# Patient Record
Sex: FEMALE | Race: WHITE | NOT HISPANIC OR LATINO | URBAN - METROPOLITAN AREA
[De-identification: names, ages, dates, MRNs, and addresses within clinical notes are randomized per-mention and may not be internally consistent; named-entity substitution may affect disease eponyms.]

---

## 2017-03-23 ENCOUNTER — COMMUNICATION - HEALTHEAST (OUTPATIENT)
Dept: TELEHEALTH | Facility: CLINIC | Age: 37
End: 2017-03-23

## 2017-03-23 ENCOUNTER — OFFICE VISIT - HEALTHEAST (OUTPATIENT)
Dept: OBGYN | Facility: CLINIC | Age: 37
End: 2017-03-23

## 2017-03-23 DIAGNOSIS — Z00.00 ROUTINE GENERAL MEDICAL EXAMINATION AT A HEALTH CARE FACILITY: ICD-10-CM

## 2017-03-23 DIAGNOSIS — Z12.31 VISIT FOR SCREENING MAMMOGRAM: ICD-10-CM

## 2017-03-23 ASSESSMENT — MIFFLIN-ST. JEOR: SCORE: 1532.92

## 2017-03-27 ENCOUNTER — HOSPITAL ENCOUNTER (OUTPATIENT)
Dept: MAMMOGRAPHY | Facility: HOSPITAL | Age: 37
Discharge: HOME | End: 2017-03-27
Attending: FAMILY MEDICINE

## 2017-03-27 DIAGNOSIS — Z12.31 VISIT FOR SCREENING MAMMOGRAM: ICD-10-CM

## 2017-03-28 LAB
HPV INTERPRETATION - HISTORICAL: NORMAL
HPV INTERPRETER - HISTORICAL: NORMAL

## 2017-03-31 LAB

## 2017-05-03 ENCOUNTER — OFFICE VISIT - HEALTHEAST (OUTPATIENT)
Dept: INTERNAL MEDICINE | Facility: CLINIC | Age: 37
End: 2017-05-03

## 2017-05-03 DIAGNOSIS — K59.00 CONSTIPATION: ICD-10-CM

## 2017-05-03 DIAGNOSIS — R68.89 COLD INTOLERANCE: ICD-10-CM

## 2017-05-03 DIAGNOSIS — M54.9 BACK PAIN: ICD-10-CM

## 2017-05-03 DIAGNOSIS — Z87.898 HISTORY OF SEIZURE: ICD-10-CM

## 2017-05-03 DIAGNOSIS — Z90.710 HISTORY OF HYSTERECTOMY: ICD-10-CM

## 2017-05-03 DIAGNOSIS — F32.A DEPRESSION: ICD-10-CM

## 2017-05-03 ASSESSMENT — MIFFLIN-ST. JEOR: SCORE: 1527.48

## 2017-05-04 ENCOUNTER — COMMUNICATION - HEALTHEAST (OUTPATIENT)
Dept: INTERNAL MEDICINE | Facility: CLINIC | Age: 37
End: 2017-05-04

## 2017-05-31 ENCOUNTER — OFFICE VISIT - HEALTHEAST (OUTPATIENT)
Dept: INTERNAL MEDICINE | Facility: CLINIC | Age: 37
End: 2017-05-31

## 2017-05-31 DIAGNOSIS — K59.00 CONSTIPATION: ICD-10-CM

## 2017-05-31 DIAGNOSIS — Z87.898 HISTORY OF SEIZURE: ICD-10-CM

## 2017-05-31 DIAGNOSIS — M54.9 BACK PAIN: ICD-10-CM

## 2017-05-31 DIAGNOSIS — F41.8 DEPRESSION WITH ANXIETY: ICD-10-CM

## 2017-07-12 ENCOUNTER — OFFICE VISIT - HEALTHEAST (OUTPATIENT)
Dept: INTERNAL MEDICINE | Facility: CLINIC | Age: 37
End: 2017-07-12

## 2017-07-12 DIAGNOSIS — H61.101 LESION OF PINNA, RIGHT: ICD-10-CM

## 2017-07-12 DIAGNOSIS — F41.8 DEPRESSION WITH ANXIETY: ICD-10-CM

## 2017-08-02 ENCOUNTER — COMMUNICATION - HEALTHEAST (OUTPATIENT)
Dept: ADMINISTRATIVE | Facility: CLINIC | Age: 37
End: 2017-08-02

## 2017-10-25 ENCOUNTER — OFFICE VISIT - HEALTHEAST (OUTPATIENT)
Dept: OTOLARYNGOLOGY | Facility: CLINIC | Age: 37
End: 2017-10-25

## 2017-10-25 DIAGNOSIS — H61.91 LESION OF RIGHT EXTERNAL EAR: ICD-10-CM

## 2017-10-25 RX ORDER — FLUOCINOLONE ACETONIDE 0.25 MG/G
OINTMENT TOPICAL
Qty: 15 G | Refills: 0 | Status: SHIPPED | OUTPATIENT
Start: 2017-10-25

## 2017-12-22 ENCOUNTER — COMMUNICATION - HEALTHEAST (OUTPATIENT)
Dept: INTERNAL MEDICINE | Facility: CLINIC | Age: 37
End: 2017-12-22

## 2017-12-22 RX ORDER — VENLAFAXINE HYDROCHLORIDE 37.5 MG/1
37.5 CAPSULE, EXTENDED RELEASE ORAL DAILY
Qty: 90 CAPSULE | Refills: 1 | Status: SHIPPED | OUTPATIENT
Start: 2017-12-22

## 2021-05-30 VITALS — BODY MASS INDEX: 35.61 KG/M2 | WEIGHT: 201 LBS | HEIGHT: 63 IN

## 2021-05-31 VITALS — BODY MASS INDEX: 35.82 KG/M2 | WEIGHT: 199 LBS

## 2021-05-31 VITALS — WEIGHT: 199.8 LBS | HEIGHT: 63 IN | BODY MASS INDEX: 35.4 KG/M2

## 2021-05-31 VITALS — BODY MASS INDEX: 35.96 KG/M2 | WEIGHT: 199.8 LBS

## 2021-06-09 NOTE — PROGRESS NOTES
Assessment:     1. Routine general medical examination at a health care facility     2. Visit for screening mammogram          Plan:      I recommended she eat a healthy diet and exercise on a regular basis. Pap smear and HPV testing today. Mammogram ordered. Discussed importance of regular PCP and gave options for who to establish care with. PHI signed to review previous records including gyn history that lead up to partial hyst for cervical dysplasia. The following high BMI interventions were performed this visit: encouragement to exercise   Encouraged smoking cessation.     Addendum: Reviewed records from [place] showing KRISTOFER II-III on LEEP with focally pos margins in . Awaiting on records from [facility] that followed LEEP.   Now pap and hpv negative. Pt notified and encourage to repeat in 3 years.         Subjective:     Babs Huddleston is a 36 y.o. female who presents for an annual exam.  Moved from [place] 4 years ago and hasn't had routine care since this time. Here for CPE. Had partial hyst in  for cervical dysplasia. Ovaries in place, but uterus/cervix removed per pt. Will get records. 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 Smokes 6-7 cig/day and is trying to quit. She doesn't want rx help at this time. Has some anxiety when she leaves the house so she doesn't exercise very much.     The patient reports that there is not domestic violence in her life.     Healthy Habits:   Regular Exercise: no    Sunscreen Use: Yes  Healthy Diet: Yes  Dental Visits Regularly: No  Sexually active: Yes  Colonoscopy: N/A  Prevention of Osteoporosis: No  Last Dexa: N/A      There is no immunization history on file for this patient.  Immunization status: up to date and documented.    Gynecologic History  No LMP recorded.  Contraception: none  Last Pap: unknown. Results were: h/o cervical dysplasia and partial hyst in . PHI signed.  Last mammogram: 4+ years ago. Results were: Unknown, but PHI signed    OB History     Para Term  AB SAB TAB Ectopic Multiple Living   7 4   3 3    4      # Outcome Date GA Lbr Regis/2nd Weight Sex Delivery Anes PTL Lv   7 SAB            6 SAB            5 SAB            4 Para            3 Para            2 Para            1 Para                   No current outpatient prescriptions on file.     No current facility-administered medications for this visit.      Past Medical History:   Diagnosis Date     Anxiety      Cervical dysplasia      Depression      Seizures      Past Surgical History:   Procedure Laterality Date     HERNIA REPAIR       PARTIAL HYSTERECTOMY       Review of patient's allergies indicates no known allergies.  Family History   Problem Relation Age of Onset     Depression Sister      Breast cancer Maternal Grandmother      Cancer Maternal Grandfather      lung cancer     Heart disease Paternal Grandfather      Social History     Social History     Marital status:      Spouse name: N/A     Number of children: N/A     Years of education: N/A     Occupational History     Not on file.     Social History Main Topics     Smoking status: Light Tobacco Smoker     Smokeless tobacco: Not on file     Alcohol use No     Drug use: Yes     Special: Marijuana     Sexual activity: Yes     Partners: Male     Birth control/ protection: None, Other      Comment: partial hysterectomy     Other Topics Concern     Not on file     Social History Narrative     No narrative on file       Review of Systems  General:  Negative except as noted above  Eyes: Negative except as noted above  Ears/Nose/Throat: Negative except as noted above  Cardiovascular: Negative except as noted above  Respiratory:  Negative except as noted above  Gastrointestinal:  Negative except as noted above  Musculoskeletal:  Negative except as noted above  Skin: Negative except as noted above  Neurologic: Negative except as noted above  Psychiatric: Negative except as noted above  Endocrine: Negative except as noted  "above  Heme/Lymphatic: Negative except as noted above   Allergic/Immunologic: Negative except as noted above      Objective:      /67 (Patient Site: Right Arm, Patient Position: Sitting, Cuff Size: Adult Regular)  Pulse 72  Ht 5' 2.5\" (1.588 m)  Wt 201 lb (91.2 kg)  SpO2 98%  BMI 36.18 kg/m2    Physical Exam:  General Appearance: Alert, cooperative, no distress.  Head: Normocephalic, without obvious abnormality, atraumatic  Eyes: PERRL, conjunctiva/corneas clear, EOM's intact  Ears: Normal TM's and external ear canals, both ears  Nose: Nares normal, septum midline,mucosa normal, no drainage  Throat: Lips, mucosa, and tongue normal  Neck: Supple, symmetrical, trachea midline, no adenopathy;  thyroid: not enlarged, symmetric, no tenderness/mass/nodules  Back: Symmetric, no curvature, ROM normal, no CVA tenderness  Lungs: Clear to auscultation bilaterally, respirations unlabored  Breasts: No breast masses, tenderness, asymmetry, or nipple discharge.  Heart: Regular rate and rhythm, S1 and S2 normal, no murmur, rub, or gallop, Abdomen: Soft, non-tender, bowel sounds active all four quadrants,  no masses, no organomegaly  Pelvic: Normal-appearing female genitalia.  Normal vaginal mucosa on speculum exam. Cervix absent.  No adnexal masses. Absent uterus.   Extremities: Extremities normal, atraumatic, no cyanosis or edema  Skin: Skin color, texture, turgor normal, no rashes or lesions  Lymph nodes: Cervical, supraclavicular, and axillary nodes normal  Neurologic: Normal  Psych: Normal affect.  Does not appear anxious or depressed.     "

## 2021-06-10 NOTE — PROGRESS NOTES
Internal Medicine Office Visit  Patient Name: Babs Becker  Patient Age: 36 y.o.  YOB: 1980  MRN: [id number]  ?  Date of Visit: 5/3/2017  Reason for Office Visit:   Chief Complaint   Patient presents with     [facility]     [facility] from [facility], records in media. Depression/ anxiety       Assessment / Plan / Medical Decision Makin. History of seizure  2. Cold intolerance  3. Depression  4. History of hysterectomy-- d/t cervical dysplagia  - PAP smear UTD  - Initially discussed using Wellbutrin for treatment of depression, however this was before she mentioned a history of seizure disorder.  This prescription has been canceled and she is advised to not take this medication as this may lower her seizure threshold.  Instead for depression, will start venlafaxine 37.5 mg daily.  Follow-up in the office in 3 weeks for depression recheck.  She declines referral to a psychologist at the present time.  Could consider echo therapy at the spine center due to concomitant chronic back pain  - Referral to neurology for follow-up of seizure disorder.  She has not had a seizure in the past 10 years.  - tetanus booster today  - Tobacco cessation recommended, she is interested in cessation      Health Maintenance Review  Health Maintenance   Topic Date Due     DEPRESSION FOLLOW UP  1978     TD 18+ HE  1996     TDAP ADULT ONE TIME DOSE  1996     ADVANCE DIRECTIVES DISCUSSED WITH PATIENT  1996     INFLUENZA VACCINE RULE BASED (Season Ended) 2017     PAP SMEAR  2022         I am having Ms. Becker start on venlafaxine.     HPI:   Encounter Diagnoses   Name Primary?     History of seizure Yes     Cold intolerance      Depression      History of hysterectomy-- d/t cervical dysplagia      Constipation      Back pain       Babs Becker is a 36-year-old female who presents to the office today [facility].  She states that she moved here from [place] in  but has not  sought any kind of medical treatment since moving here.  At first she did not have health insurance.    We reviewed her history of depression.  She states that she does not have any thoughts of suicide but thinks about not waking up sometimes.  Feels fatigued and unmotivated particularly in the morning.  She sometimes has to force herself to get up and take a shower.  She has been treated with sertraline and duloxetine in the past.  She states that she had temporary relief in symptoms but that within 6 months to 1 year these medications were no longer effective.    Patient has generalized body aches.  States that her back pain is the most bothersome to her.  She states she had an injury at age 17 when she fell out of the back of a truck.  She was told she may have had a pelvic fracture at some point in the past.  She has had treatment with muscle relaxers and naproxen.    Continues to smoke regularly.  States that she does this typically when she is bored.  She does wish to quit.    She has a history of seizure disorder.  She had a febrile seizure at age 1.  Another seizure around age 7 or 8.  Most recent she is seizure was at age 22.  She was previously taking Keppra but then lost her health insurance and no longer followed up with neurology or took this medication.    She describes frequent leg movements and tremors particularly at night.  She is uncertain if she continues to have seizure-like activity.    She has recurrent constipation.  She takes an over-the-counter laxative when needed.  She typically uses a limited cayenne pepper combination is a natural supplement to help with bowel movements.    She has a history of cervical dysplasia, tx with hysterectomy.  Recent Pap smear was normal.    Review of Systems: If positive, the following ROS is bolded:  Constitutional: Fever, chills, night sweats fainting, weight change (gain), sleeping difficulties (frequent leg movements)  Eyes: Visual changes, eye pain,  "double vision  HENT: Changes in hearing, ear pain, tinnitus, hoarseness, difficulty swallowing  Respiratory: Wheeze, shortness of breath, cough, and exercise intolerance   Cardiovascular: Chest pain, dyspnea, tachycardia, palpitations, syncope, dizziness or lightheadedness  Gastrointestinal: Nausea, vomiting, diarrhea, dyspepsia, irregular BMs, and melena, constipation  Genitourinary: Dysuria, frequency, hematuria, nocturia, vaginal discharge, pelvic pain  Integumentary: Pruritis, rashes, lesions, wounds   Musculoskeletal: Back pain, joint pain/stiffness  Neurological: Changes in balance, mental status, paresthesias, weakness, headache  Behavioral/Psych: Difficulty concentrating, mood swings, depression or anxiety, insomnia  Endo: cold intolerance     Current Scheduled Meds:  Outpatient Encounter Prescriptions as of 5/3/2017   Medication Sig Dispense Refill     venlafaxine (EFFEXOR XR) 37.5 MG 24 hr capsule Take 1 capsule (37.5 mg total) by mouth daily. 30 capsule 0     [DISCONTINUED] buPROPion (WELLBUTRIN SR) 150 MG 12 hr tablet Take 1 tablet daily x 3 days then increase to twice daily thereafter. Stop smoking 7 days after starting the medication. 60 tablet 1     No facility-administered encounter medications on file as of 5/3/2017.      Past Medical History:   Diagnosis Date     Anxiety      Cervical dysplasia      Depression      Seizures      Past Surgical History:   Procedure Laterality Date     CERVICAL BIOPSY  W/ LOOP ELECTRODE EXCISION  2005    see scanned records from [place]     HERNIA REPAIR       PARTIAL HYSTERECTOMY       Social History   Substance Use Topics     Smoking status: Light Tobacco Smoker     Smokeless tobacco: None     Alcohol use No       Objective / Physical Examination:  Vitals:    05/03/17 1606   BP: 124/80   Patient Site: Left Arm   Patient Position: Sitting   Cuff Size: Adult Large   Pulse: 79   Weight: 199 lb 12.8 oz (90.6 kg)   Height: 5' 2.5\" (1.588 m)     Wt Readings from Last 3 " Encounters:   05/03/17 199 lb 12.8 oz (90.6 kg)   03/23/17 201 lb (91.2 kg)     Body mass index is 35.96 kg/(m^2).     General Appearance: Alert and oriented, cooperative, affect appropriate, speech clear, in no apparent distress  Lungs: Clear to auscultation bilaterally. Normal inspiratory and expiratory effort  Cardiovascular: Regular rate, normal S1, S2. No murmurs, rubs, or gallops  Psych: Casually groomed. Thought content normal. Reserved     Orders Placed This Encounter   Procedures     Tdap vaccine,  8yo or older,  IM     Thyroid Stimulating Hormone (TSH)     Ambulatory referral to Neurology   Followup: Return in about 3 weeks (around 5/24/2017) for Recheck. earlier if needed.    Time with patient: 30 minutes with >50% of time spent in counseling, coordination of care, and review of PMH    Kaci Hancock, CARLY  [facility]

## 2021-06-11 NOTE — PROGRESS NOTES
Internal Medicine Office Visit  Patient Name: Babs Becker  Patient Age: 36 y.o.  YOB: 1980  MRN: [id number]  ?  Date of Visit: 2017  Reason for Office Visit:   Chief Complaint   Patient presents with     Follow-up     depression and anxiety. Pt stated said with medication anxiety much better but the depression is unchanged       Assessment / Plan / Medical Decision Makin. Depression with anxiety  2. History of seizure  3. Back pain  4. Constipation  - Increase venlafaxine to 75 mg daily  - Consider PT for back pain, patient would like to try her own rehab program at home [facility]  - Keep appointment with neurology       Health Maintenance Review  Health Maintenance   Topic Date Due     ADVANCE DIRECTIVES DISCUSSED WITH PATIENT  1996     INFLUENZA VACCINE RULE BASED (Season Ended) 2017     DEPRESSION FOLLOW UP  2017     PAP SMEAR  2022     TD 18+ HE  2027     TDAP ADULT ONE TIME DOSE  Completed         I have discontinued Ms. Becker's venlafaxine. I am also having her start on venlafaxine.     HPI:   Encounter Diagnoses   Name Primary?     Depression with anxiety Yes     History of seizure      Back pain      Constipation         Smoking- still smokes about 5-7 cigarettes per day. Not interested in smoking cessation assistance at this time. Feels much less anxious, is hopeful she can get a job and that this will help her to stop smoking by a change in schedule.     Anxiety- Very pleased with the improvement in her ascites symptoms.  She can now go to the grocery store without worry and states that she no longer leaves the grocery store early without getting the supplies that she needs.  She is now hopeful that she can get a job and start working as previously her anxiety held her back from even being able to pursue a job.  She still has quite a few depression symptoms.  She states that she is sleeping all of the time and has little motivation to get up and  "do anything.  She sometimes does not feel like doing personal hygiene. Spends a lot of time in her apartment on a couch.      History of seizure disorder- June 22 has an appointment with neurology. Still has twitching in her feet and episodes where she \"spaces out\". Previously treated for absence seizures with Keppra.     Back pain/ body aches- Doesn't seem as intense now that she feels less tense/anxious. Low back pain still there, radiates into her gluts and legs. Occurs on both legs, more often when she is very tense. She previously took naproxen and muscle relaxers. She has done PT before but believes it was around 2010. Will see if increased physical activity helps now that she is less anxious about leaving her apartment.  drives her to appointments because she doesn't like to drive so not interested in PT at this time.    Constipation- Started after a right inguinal hernia surgery. She has a natural supplement of apple cider vinegar/silver pepper/lemon to help her to become more regular. Sometimes she takes docusate. Not very physically active. Admits that she drinks a lot of coffee and tea but only 1-2 glasses of water typically.      Review of Systems:  No thoughts of suicide or self harm. Pertinent findings as in HPI      Current Scheduled Meds:  Outpatient Encounter Prescriptions as of 5/31/2017   Medication Sig Dispense Refill     [DISCONTINUED] venlafaxine (EFFEXOR XR) 37.5 MG 24 hr capsule Take 1 capsule (37.5 mg total) by mouth daily. 30 capsule 0     venlafaxine (EFFEXOR XR) 75 MG 24 hr capsule Take 1 capsule (75 mg total) by mouth daily. 30 capsule 1     No facility-administered encounter medications on file as of 5/31/2017.      Past Medical History:   Diagnosis Date     Anxiety      Cervical dysplasia      Depression      Seizures      Past Surgical History:   Procedure Laterality Date     CERVICAL BIOPSY  W/ LOOP ELECTRODE EXCISION  2005    see scanned records from [place]     HERNIA REPAIR   "     PARTIAL HYSTERECTOMY       Social History   Substance Use Topics     Smoking status: Light Tobacco Smoker     Smokeless tobacco: None     Alcohol use No       Objective / Physical Examination:  Vitals:    05/31/17 1632   BP: 102/70   Patient Site: Right Arm   Patient Position: Sitting   Cuff Size: Adult Regular   Pulse: 70   Weight: 199 lb 12.8 oz (90.6 kg)     Wt Readings from Last 3 Encounters:   05/31/17 199 lb 12.8 oz (90.6 kg)   05/03/17 199 lb 12.8 oz (90.6 kg)   03/23/17 201 lb (91.2 kg)     Body mass index is 35.96 kg/(m^2).     General Appearance: Alert and oriented, cooperative, affect appropriate, speech clear, in no apparent distress  Psych: Casually groomed. Thought content normal. More interactive today than previous visit       Orders Placed This Encounter   Procedures     Tdap vaccine,  6yo or older,  IM   Followup: Return in about 6 weeks (around 7/12/2017) for Recheck. earlier if needed.    Total time spent with patient was 25 minutes with >50% of time spent in face-to-face counseling regarding the above plan       Kaci Hancock, CNP  [facility]

## 2021-06-11 NOTE — PROGRESS NOTES
"Internal Medicine Office Visit  Patient Name: Babs Becker  Patient Age: 36 y.o.  YOB: 1980  MRN: [id number]  ?  Date of Visit: 2017  Reason for Office Visit:   Chief Complaint   Patient presents with     Follow-up       Assessment / Plan / Medical Decision Makin. Depression with anxiety  2. Lesion of pinna, right  - Referral to ENT for pinna lesions, present for the past 3 years per patient  - Increase venlafaxine to 112.5 mg daily. Discussed relaxation techniques. Increase physical activity to improve endurance and conditioning which may contribute to reported fatigue  - Referral to psychology for depression and anxiety-- she needs to check on the cost of this service through her insurance  - Follow up in 2-3 months      Health Maintenance Review  Health Maintenance   Topic Date Due     ADVANCE DIRECTIVES DISCUSSED WITH PATIENT  1996     INFLUENZA VACCINE RULE BASED (1) 2017     DEPRESSION FOLLOW UP  2017     PAP SMEAR  2022     TD 18+ HE  2027     TDAP ADULT ONE TIME DOSE  Completed           I am having Ms. Becker start on venlafaxine. I am also having her maintain her venlafaxine.     HPI:   Encounter Diagnoses   Name Primary?     Depression with anxiety Yes     Lesion of pinna, right       Babs Becker is a 36-year-old female who presents to the office today with her  for follow-up of depression and anxiety.  She has noticed that the venlafaxine has helped with her anxiety symptoms and she is \"100% better\".  She can go out into public and to the store without feeling anxious and wanting to leave.  She still feels tired and has a lack of motivation.  She still has some depressive thoughts and feelings although she is noticing a slight improvement and her  agrees with this.  She is not experiencing any side effects to the medication.    She has a concern regarding a right pinna \"sore\" which has been present for the past 3 years " "approximately.  She repeatedly washes this area and describes off a crusted scabbed area which then always returns.    She has a four-day history of her fourth fingertip tingling on each finger, and achy feeling in her shoulders, and right wrist pain.  She denies any new exercises or activities.  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  She has wrist discomfort and has to \"pop in the wrist to make it feel better\".    Continues to have low back pain with right sciatica.  She is not currently doing any exercises for this.  She previously declined physical therapy.      Review of Systems: No seizures, last seizure age 22 per patient report. Denies thoughts of suicide or self harm.     Current Scheduled Meds:  Outpatient Encounter Prescriptions as of 7/12/2017   Medication Sig Dispense Refill     venlafaxine (EFFEXOR XR) 75 MG 24 hr capsule Take 1 capsule (75 mg total) by mouth daily. 90 capsule 1     [DISCONTINUED] venlafaxine (EFFEXOR XR) 75 MG 24 hr capsule Take 1 capsule (75 mg total) by mouth daily. 30 capsule 1     venlafaxine (EFFEXOR XR) 37.5 MG 24 hr capsule Take 1 capsule (37.5 mg total) by mouth daily. 90 capsule 1     No facility-administered encounter medications on file as of 7/12/2017.      Past Medical History:   Diagnosis Date     Anxiety      Cervical dysplasia      Depression      Seizures      Past Surgical History:   Procedure Laterality Date     CERVICAL BIOPSY  W/ LOOP ELECTRODE EXCISION  2005    see scanned records from [place]     HERNIA REPAIR       PARTIAL HYSTERECTOMY       Social History   Substance Use Topics     Smoking status: Light Tobacco Smoker     Smokeless tobacco: None     Alcohol use No       Objective / Physical Examination:  Vitals:    07/12/17 1603   BP: 112/70   Pulse: 72   Weight: 199 lb (90.3 kg)     Wt Readings from Last 3 Encounters:   07/12/17 199 lb (90.3 kg) "   05/31/17 199 lb 12.8 oz (90.6 kg)   05/03/17 199 lb 12.8 oz (90.6 kg)     Body mass index is 35.82 kg/(m^2).     General Appearance: Alert and oriented, cooperative, affect appropriate, speech clear, in no apparent distress  ENT: right pinna 2 mm ulceration. No bleeding or crusting.   MSK: Full shoulder ROM, strength is 5/5 and symmetrical. No reproduction of pain with movements. Right elbow lateral epicondyle pain to palpation. Negative Phalen.   CV: radial pulses 2+ and symmetrical     Orders Placed This Encounter   Procedures     Ambulatory referral to Psychology     Ambulatory referral to ENT   Followup: Return in about 2 months (around 9/12/2017) for Recheck. earlier if needed.      Kaci Hancock, CNP  [facility]

## 2021-06-13 NOTE — PROGRESS NOTES
Babs Becker is a 37 y.o. female seen in consultation at the request of Dr. Hancock for right ear lesion. She has area of repeated scabbing in the root of the right superir helix.  Will scab and then b painful.  Has repsonded to Neomycin and cocnut oil in the past but then returns.  She notes her skin is generally dry and itchy.     ALLERGY:  No Known Allergies    MEDICATIONS:     Current Outpatient Prescriptions on File Prior to Visit   Medication Sig Dispense Refill     venlafaxine (EFFEXOR XR) 37.5 MG 24 hr capsule Take 1 capsule (37.5 mg total) by mouth daily. 90 capsule 1     venlafaxine (EFFEXOR XR) 75 MG 24 hr capsule Take 1 capsule (75 mg total) by mouth daily. 90 capsule 1     No current facility-administered medications on file prior to visit.        Past Medical/Surgical History, Family History and Social History reviewed in detail and documented separately in the medical record.    Complete Review of Systems:  A 10-point review was performed.  Pertinent positives are noted in the HPI and on a separate scanned document in the chart.    EXAM:  There were no vitals filed for this visit.    Nurse documentation reviewed  and documented separately.    General Appearance: Pleasant, alert, appropriate appearance for age. No acute distress    Head Exam: Normal. Normocephalic, atraumatic.    Eye Exam: Normal external eye, conjunctiva, lids, cornea. Extra-ocular movements are intact.    Left external ear: normal  Left otoscopic exam: Normal EAC. Normal TM     Right external ear:  1 mm area of eczema versus ulcer root of right superior helix.    Right otoscopic exam: Normal EAC. Normal TM    Nose Exam: Normal external nose. Septum midline. Nasal mucosa normal.  Inferior turbinates normal.    OroPharynx Exam: Dental hygiene adequate. Normal tongue. Normal buccal mucosa. Normal palate.  Normal pharynx. Normal tonsils.    Neck Exam: Supple, no masses or nodes. Trachea and larynx midline.    Thyroid Exam: No  tenderness, nodules or enlargement.    Salivary Glands: nontender without masses    Neuro: Alert and oriented times 3, CN 2-12 grossly intact, no nystagmus, PERRL, EOMI, normal speech and gait    Chest/Respiratory Exam: Normal chest wall motion and respiratory effort. No audible stridor or wheezing.    Cardiovascular Exam: Regular rate and rhythm.  No cyanosis, clubbing or edema.    Pulses: carotid pulses normal    ASSESSMENT:  1. Lesion of right external ear        PLAN: Findings, assessment, and management options were discussed. Will try a steroid ointment on it for a couple of weeks and see her for recheck.  If still bothering her, consider punch biopsy

## 2021-06-15 PROBLEM — F41.8 DEPRESSION WITH ANXIETY: Status: ACTIVE | Noted: 2017-05-04

## 2021-06-15 PROBLEM — Z87.898 HISTORY OF SEIZURE: Status: ACTIVE | Noted: 2017-05-03

## 2021-06-15 PROBLEM — K59.00 CONSTIPATION: Status: ACTIVE | Noted: 2017-05-04

## 2021-06-15 PROBLEM — Z90.710 HISTORY OF HYSTERECTOMY: Status: ACTIVE | Noted: 2017-05-04

## 2021-06-15 PROBLEM — M54.9 BACK PAIN: Status: ACTIVE | Noted: 2017-05-04

## 2021-06-16 PROBLEM — H61.91 LESION OF RIGHT EXTERNAL EAR: Status: ACTIVE | Noted: 2017-10-25

## 2021-06-27 ENCOUNTER — HEALTH MAINTENANCE LETTER (OUTPATIENT)
Age: 41
End: 2021-06-27

## 2021-10-17 ENCOUNTER — HEALTH MAINTENANCE LETTER (OUTPATIENT)
Age: 41
End: 2021-10-17

## 2022-07-24 ENCOUNTER — HEALTH MAINTENANCE LETTER (OUTPATIENT)
Age: 42
End: 2022-07-24

## 2022-10-02 ENCOUNTER — HEALTH MAINTENANCE LETTER (OUTPATIENT)
Age: 42
End: 2022-10-02

## 2023-08-12 ENCOUNTER — HEALTH MAINTENANCE LETTER (OUTPATIENT)
Age: 43
End: 2023-08-12